# Patient Record
Sex: FEMALE | Race: OTHER | Employment: PART TIME | URBAN - METROPOLITAN AREA
[De-identification: names, ages, dates, MRNs, and addresses within clinical notes are randomized per-mention and may not be internally consistent; named-entity substitution may affect disease eponyms.]

---

## 2017-03-10 ENCOUNTER — TELEPHONE (OUTPATIENT)
Dept: INTERNAL MEDICINE CLINIC | Facility: CLINIC | Age: 27
End: 2017-03-10

## 2017-03-10 NOTE — TELEPHONE ENCOUNTER
Pt requesting note from Dr Enzo Hall stating TB test done last March would still be effective  P/u [facility]- see other 3/10 results encounter

## 2017-03-10 NOTE — TELEPHONE ENCOUNTER
Patient is requesting to have a copy of her TB Test results printed so that she may present this to a new potential employer.  She must be able to provide these results by Monday at the latest. She has requested that her father be able to pick these up on h

## 2017-03-10 NOTE — TELEPHONE ENCOUNTER
Pt calling back said had TB test last March- requesting if  Dr Prachi Hernandez would write note stating this test would still be effective- see other 3/10 note encounter  Needs to go  along with copy of TB results